# Patient Record
Sex: FEMALE | Employment: FULL TIME | URBAN - METROPOLITAN AREA
[De-identification: names, ages, dates, MRNs, and addresses within clinical notes are randomized per-mention and may not be internally consistent; named-entity substitution may affect disease eponyms.]

---

## 2019-02-03 ENCOUNTER — HOSPITAL ENCOUNTER (EMERGENCY)
Facility: CLINIC | Age: 34
Discharge: HOME | End: 2019-02-03
Attending: EMERGENCY MEDICINE | Admitting: EMERGENCY MEDICINE
Payer: MEDICAID

## 2019-02-03 ENCOUNTER — APPOINTMENT (OUTPATIENT)
Dept: ULTRASOUND IMAGING | Facility: CLINIC | Age: 34
End: 2019-02-03
Payer: MEDICAID

## 2019-02-03 ENCOUNTER — APPOINTMENT (OUTPATIENT)
Dept: GENERAL RADIOLOGY | Facility: CLINIC | Age: 34
End: 2019-02-03
Payer: MEDICAID

## 2019-02-03 VITALS
DIASTOLIC BLOOD PRESSURE: 73 MMHG | BODY MASS INDEX: 23.9 KG/M2 | OXYGEN SATURATION: 100 % | HEIGHT: 64 IN | TEMPERATURE: 98.2 F | HEART RATE: 69 BPM | SYSTOLIC BLOOD PRESSURE: 108 MMHG | WEIGHT: 140 LBS | RESPIRATION RATE: 18 BRPM

## 2019-02-03 DIAGNOSIS — R10.2 PELVIC PAIN AFFECTING PREGNANCY IN FIRST TRIMESTER, ANTEPARTUM: ICD-10-CM

## 2019-02-03 DIAGNOSIS — O26.891 PELVIC PAIN AFFECTING PREGNANCY IN FIRST TRIMESTER, ANTEPARTUM: ICD-10-CM

## 2019-02-03 LAB
ABO + RH BLD: NORMAL
ABO + RH BLD: NORMAL
ALBUMIN UR-MCNC: NEGATIVE MG/DL
ANION GAP SERPL CALCULATED.3IONS-SCNC: 7 MMOL/L (ref 3–14)
APPEARANCE UR: CLEAR
B-HCG SERPL-ACNC: ABNORMAL IU/L (ref 0–5)
BASOPHILS # BLD AUTO: 0.1 10E9/L (ref 0–0.2)
BASOPHILS NFR BLD AUTO: 0.5 %
BILIRUB UR QL STRIP: NEGATIVE
BLD GP AB SCN SERPL QL: NORMAL
BLOOD BANK CMNT PATIENT-IMP: NORMAL
BUN SERPL-MCNC: 8 MG/DL (ref 7–30)
CALCIUM SERPL-MCNC: 8.4 MG/DL (ref 8.5–10.1)
CHLORIDE SERPL-SCNC: 109 MMOL/L (ref 94–109)
CO2 SERPL-SCNC: 24 MMOL/L (ref 20–32)
COLOR UR AUTO: ABNORMAL
CREAT SERPL-MCNC: 0.58 MG/DL (ref 0.52–1.04)
DIFFERENTIAL METHOD BLD: ABNORMAL
EOSINOPHIL # BLD AUTO: 0.2 10E9/L (ref 0–0.7)
EOSINOPHIL NFR BLD AUTO: 1.8 %
ERYTHROCYTE [DISTWIDTH] IN BLOOD BY AUTOMATED COUNT: 12.6 % (ref 10–15)
GFR SERPL CREATININE-BSD FRML MDRD: >90 ML/MIN/{1.73_M2}
GLUCOSE SERPL-MCNC: 91 MG/DL (ref 70–99)
GLUCOSE UR STRIP-MCNC: NEGATIVE MG/DL
HCT VFR BLD AUTO: 39 % (ref 35–47)
HGB BLD-MCNC: 12.7 G/DL (ref 11.7–15.7)
HGB UR QL STRIP: NEGATIVE
IMM GRANULOCYTES # BLD: 0.1 10E9/L (ref 0–0.4)
IMM GRANULOCYTES NFR BLD: 0.5 %
KETONES UR STRIP-MCNC: NEGATIVE MG/DL
LEUKOCYTE ESTERASE UR QL STRIP: NEGATIVE
LYMPHOCYTES # BLD AUTO: 2.4 10E9/L (ref 0.8–5.3)
LYMPHOCYTES NFR BLD AUTO: 24.5 %
MCH RBC QN AUTO: 30 PG (ref 26.5–33)
MCHC RBC AUTO-ENTMCNC: 32.6 G/DL (ref 31.5–36.5)
MCV RBC AUTO: 92 FL (ref 78–100)
MONOCYTES # BLD AUTO: 0.9 10E9/L (ref 0–1.3)
MONOCYTES NFR BLD AUTO: 9.5 %
NEUTROPHILS # BLD AUTO: 6.2 10E9/L (ref 1.6–8.3)
NEUTROPHILS NFR BLD AUTO: 63.2 %
NITRATE UR QL: NEGATIVE
NRBC # BLD AUTO: 0 10*3/UL
NRBC BLD AUTO-RTO: 0 /100
PH UR STRIP: 8 PH (ref 5–7)
PLATELET # BLD AUTO: 133 10E9/L (ref 150–450)
POTASSIUM SERPL-SCNC: 3.6 MMOL/L (ref 3.4–5.3)
RBC # BLD AUTO: 4.23 10E12/L (ref 3.8–5.2)
RBC #/AREA URNS AUTO: <1 /HPF (ref 0–2)
SODIUM SERPL-SCNC: 140 MMOL/L (ref 133–144)
SOURCE: ABNORMAL
SP GR UR STRIP: 1.01 (ref 1–1.03)
SPECIMEN EXP DATE BLD: NORMAL
SPECIMEN SOURCE: NORMAL
SQUAMOUS #/AREA URNS AUTO: 1 /HPF (ref 0–1)
TROPONIN I SERPL-MCNC: <0.015 UG/L (ref 0–0.04)
UROBILINOGEN UR STRIP-MCNC: 0 MG/DL (ref 0–2)
WBC # BLD AUTO: 9.8 10E9/L (ref 4–11)
WBC #/AREA URNS AUTO: <1 /HPF (ref 0–5)
WET PREP SPEC: NORMAL

## 2019-02-03 PROCEDURE — 80048 BASIC METABOLIC PNL TOTAL CA: CPT | Performed by: EMERGENCY MEDICINE

## 2019-02-03 PROCEDURE — 85025 COMPLETE CBC W/AUTO DIFF WBC: CPT | Performed by: EMERGENCY MEDICINE

## 2019-02-03 PROCEDURE — 25000128 H RX IP 250 OP 636: Performed by: EMERGENCY MEDICINE

## 2019-02-03 PROCEDURE — 87591 N.GONORRHOEAE DNA AMP PROB: CPT | Performed by: EMERGENCY MEDICINE

## 2019-02-03 PROCEDURE — 84702 CHORIONIC GONADOTROPIN TEST: CPT | Performed by: EMERGENCY MEDICINE

## 2019-02-03 PROCEDURE — 99285 EMERGENCY DEPT VISIT HI MDM: CPT | Mod: 25

## 2019-02-03 PROCEDURE — 71046 X-RAY EXAM CHEST 2 VIEWS: CPT

## 2019-02-03 PROCEDURE — 76801 OB US < 14 WKS SINGLE FETUS: CPT

## 2019-02-03 PROCEDURE — 84484 ASSAY OF TROPONIN QUANT: CPT | Performed by: EMERGENCY MEDICINE

## 2019-02-03 PROCEDURE — 81001 URINALYSIS AUTO W/SCOPE: CPT | Performed by: EMERGENCY MEDICINE

## 2019-02-03 PROCEDURE — 87491 CHLMYD TRACH DNA AMP PROBE: CPT | Performed by: EMERGENCY MEDICINE

## 2019-02-03 PROCEDURE — 87210 SMEAR WET MOUNT SALINE/INK: CPT | Performed by: EMERGENCY MEDICINE

## 2019-02-03 PROCEDURE — 93005 ELECTROCARDIOGRAM TRACING: CPT

## 2019-02-03 PROCEDURE — 86900 BLOOD TYPING SEROLOGIC ABO: CPT | Performed by: EMERGENCY MEDICINE

## 2019-02-03 PROCEDURE — 86901 BLOOD TYPING SEROLOGIC RH(D): CPT | Performed by: EMERGENCY MEDICINE

## 2019-02-03 PROCEDURE — 86850 RBC ANTIBODY SCREEN: CPT | Performed by: EMERGENCY MEDICINE

## 2019-02-03 RX ORDER — SODIUM CHLORIDE 9 MG/ML
1000 INJECTION, SOLUTION INTRAVENOUS CONTINUOUS
Status: DISCONTINUED | OUTPATIENT
Start: 2019-02-03 | End: 2019-02-03 | Stop reason: HOSPADM

## 2019-02-03 RX ADMIN — SODIUM CHLORIDE 1000 ML: 9 INJECTION, SOLUTION INTRAVENOUS at 16:36

## 2019-02-03 ASSESSMENT — ENCOUNTER SYMPTOMS
CHILLS: 0
DYSURIA: 0
HEMATURIA: 0
NAUSEA: 0
FEVER: 0
DIARRHEA: 0
ABDOMINAL DISTENTION: 1
SHORTNESS OF BREATH: 1
FATIGUE: 1
VOMITING: 0

## 2019-02-03 ASSESSMENT — MIFFLIN-ST. JEOR: SCORE: 1315.04

## 2019-02-03 NOTE — ED PROVIDER NOTES
"  History     Chief Complaint:  Multiple Complaints      HPI   Ban Chou is a 33 year old female who is currently 7 weeks and 6 days pregnant who presents with lower abdominal pain. Her intermittent lower abdominal pain began 2.5 hours and is described as cramping. The pain began when she was arguing with her two younger sons when she had an abrupt onset of pain. She mentions that she has been feeling the baby moving but not as much as normal. Of note, the patient mentions that two weeks ago she had vaginal burning and bad odor. She believed she had a yeast infection so she bought over the counter medication which resolved the symptoms within three days. She is also complaining of chest pressure that began roughly around the same time her lower abdominal cramping began. The patient states the chest pressure is associated with shortness of breath. She also notes that she feel fatigue and as if her blood pressure was low. She mentions that she feels like she has a \"heavy rock sitting on my chest\". She denies fever, chills, nausea, vomiting, diarrhea, vaginal discharge, vaginal bleeding, dysuria, or hematuria. Of note, the patient mentions that her last period was on 2018. After the fourth pregnancy the patient had a miscarriage four years ago.      Allergies:  NKDA    Medications:    The patient is currently on no regular medications.    Past Medical History:    Varicosities    Past Surgical History:     section    Family History:    History reviewed. No pertinent family history.    Social History:  Marital Status:  Single [1]  Negative for tobacco use.  Negative for alcohol use.      Review of Systems   Constitutional: Positive for fatigue. Negative for chills and fever.   Respiratory: Positive for shortness of breath.    Cardiovascular: Positive for chest pain.   Gastrointestinal: Positive for abdominal distention. Negative for diarrhea, nausea and vomiting. " "  Genitourinary: Positive for pelvic pain. Negative for dysuria, hematuria, vaginal bleeding and vaginal discharge.   All other systems reviewed and are negative.      Physical Exam     Patient Vitals for the past 24 hrs:   BP Temp Temp src Pulse Resp SpO2 Height Weight   02/03/19 1522 108/73 98.2  F (36.8  C) Temporal 69 18 100 % 1.626 m (5' 4\") 63.5 kg (140 lb)           Physical Exam  Constitutional:  Appears well-developed and well-nourished. Alert. Conversant. Non toxic.  HENT:   Head: Atraumatic.   Nose: Nose normal.  Mouth/Throat: Oral mucosa is clear and moist. no trismus. Pharynx normal. Tonsils symmetric. No tonsillar enlargement, erythema, or exudate.  Eyes: Conjunctivae normal. EOM normal. Pupils equal, round, and reactive to light. No scleral icterus.   Neck: Normal range of motion. Neck supple. No tracheal deviation present.   Cardiovascular: Normal rate, regular rhythm. No gallop. No friction rub. No murmur heard. Symmetric radial artery pulses   Pulmonary/Chest: Effort normal. No stridor. No respiratory distress. No wheezes. No rales. No rhonchi . No tenderness.   Abdominal: Soft. Bowel sounds normal. No distension. No mass. Suprapubic mild tenderness. No rebound. No guarding. No CVA tenderness. No uterine enlargement  Pelvic: Performed with female chaperone. Normal external genitalia. Normal vaginal mucosa. Cervix closed. No bleeding.  There is some curd-like whitish vaginal discharge in the vaginal vault and attached to the cervix, but no cervical redness or strawberry cervix or cervicitis. No CMT.   Musculoskeletal:   RUE: Normal range of motion. No tenderness. No deformity  LUE: Normal range of motion. No tenderness. No deformity  RLE: Normal range of motion. No edema. No tenderness. No deformity  LLE: Normal range of motion. No edema. No tenderness. No deformity  Lymph: No cervical adenopathy.   Neurological: Alert and oriented to person, place, and time. Normal strength. CN II-VII intact. No " sensory deficit. GCS eye subscore is 4. GCS verbal subscore is 5. GCS motor subscore is 6. Normal coordination   Skin: Skin is warm and dry. No rash noted. No pallor. Normal capillary refill.  Psychiatric:  Normal mood. Normal affect.     Emergency Department Course   ECG:  Indication: Chest pressure  Time: 1544  Vent. Rate 70 bpm. CT interval 174. QRS duration 78. QT/QTc 358/386. P-R-T axis 46 40 27.  Normal sinus rhythm with sinus arrhythmia. Normal ECG. Read time: 1547    Imaging:  XR Chest 2 views:   No acute abnormality. Lungs are well-inflated and clear.  Heart size is normal. As per radiology.     US OB, <14 w Transvaginal:  1. Live intrauterine pregnancy with estimated gestational age 8 weeks  5 days by current measurement.  2. No evidence for subchorionic hemorrhage.  3. 3.3 cm left ovarian cyst. No free fluid as per radiology.     Laboratory:  CBC: WBC: 9.8, HGB: 12.7, PLT: 133(L)  BMP: Calcium 8.4(L), o/w WNL (Creatinine: 0.58)    UA with Microscopic: pH urine 8.0(H), o/w WNL    HCG Quantitative: 40,720(H)  ABO/Rh Type: O Positive    Wet prep: WNL    1622 Troponin: <0.015    Chlamydia trachomatis: pending  Neisseria gonorrhoea: pending    Interventions:  1636 Normal saline 1,000mL IV       Emergency Department Course:  Nursing notes and vitals reviewed. (1530) I performed an exam of the patient as documented above.     IV inserted. Medicine administered as documented above. Blood drawn. This was sent to the lab for further testing, results above.    EKG was done, interpretation as above.    The patient provided a urine sample here in the emergency department. This was sent for laboratory testing, findings above.     The patient was sent for a XR Chest, US OB while in the emergency department, findings above.     (1944) I rechecked the patient and discussed the results of her workup thus far.     Findings and plan explained to the Patient. Patient discharged home with instructions regarding supportive  care, medications, and reasons to return. The importance of close follow-up was reviewed.     I personally reviewed the laboratory results with the Patient and answered all related questions prior to discharge.     Impression & Plan    Medical Decision Making:  Ban Deras is a 33 year old female who I believe is a  or 4 who is now about 8 weeks pregnant based on LMP who presents to the ER today with a couple of complaints, most pressing being lower abdominal bilateral lower quadrant pelvic pain that began this afternoon.  No vaginal bleeding.  She has not yet had a chance to establish prenatal care or have any ultrasounds for this pregnancy.  Quantitative hCG is positive and consistent with dates.  Pelvic ultrasound confirms a live IUP at 8 weeks gestation.  No evidence for complex adnexal masses, ectopic pregnancy.  She has not had any vaginal bleeding and does not have any cervical dilation or bleeding on her pelvic exam but this pelvic cramping could be an early sign of threatened miscarriage.  Rh is positive.  Vital signs are stable.  Hemoglobin is normal at 12.7.  No active bleeding here in the ER.  She is having mild ongoing pain but is declining even Tylenol for analgesia.  She was very pleased to hear of a viable IUP consistent with dates on the ultrasound.  She is also had a recent whitish vaginal discharge, which had gotten better after treating it with an over-the-counter topical cream for yeast infection.  Wet prep today shows no ongoing infection.    Overall her abdominal exam is quite benign.  I do not think that her pain represents acute surgical emergency such as appendicitis think she needs advanced imaging to evaluate for that.  Other labs reassuring.  Urinalysis normal.     She was also endorsing a tight feeling in her chest.  EKG is nonischemic and shows no evidence for pericarditis.  Troponin negative.  Chest x-ray normal.  No evidence for pneumonia, pneumothorax,  pulmonary edema, pleural effusion, cardiomegaly.  No ripping tearing chest pain or any symptoms through to her back and normal mediastinum on x-ray so I doubt aortic dissection.  She is not really short of breath, tachypneic, hypoxic, tachycardic and has no pleuritic chest pain.  Although she is pregnant she has had no recent symptoms of leg swelling or ankle swelling to suggest DVT and no recent travel or risk factor for that.  Patient believes that some of her chest tightness was likely due to anxiety triggered by her concern for miscarriage with her pelvic pain.    Critical Care time:  none    Diagnosis:    ICD-10-CM    1. Pelvic pain affecting pregnancy in first trimester, antepartum O26.891     R10.2        Disposition:  discharged to home    Discharge Medications:     Medication List      There are no discharge medications for this visit.         Scribe Disclosure:  I, Deloris Beaulieu, am serving as a scribe on 2/3/2019 at 3:30 PM to personally document services performed by Horacio Gallegos, * based on my observations and the provider's statements to me.       Deloris Beaulieu  2/3/2019   [facility] EMERGENCY DEPARTMENT       Horacio Gallegos MD  02/08/19 [id number]

## 2019-02-03 NOTE — ED AVS SNAPSHOT
[facility] Emergency Department  [address]  [facility] [address]  Phone:  [phone]  Fax:  [phone]                                    Ban Deras   MRN: [id number]    Department:  [facility] Emergency Department   Date of Visit:  2/3/2019           After Visit Summary Signature Page    I have received my discharge instructions, and my questions have been answered. I have discussed any challenges I see with this plan with the nurse or doctor.    ..........................................................................................................................................  Patient/Patient Representative Signature      ..........................................................................................................................................  Patient Representative Print Name and Relationship to Patient    ..................................................               ................................................  Date                                   Time    ..........................................................................................................................................  Reviewed by Signature/Title    ...................................................              ..............................................  Date                                               Time          22EPIC Rev 08/18

## 2019-02-03 NOTE — ED TRIAGE NOTES
ABCs intact. Pt c/o Chest pressure and abdominal pain since this morning. Pt is pregnant. Denies discharge or bleeding. Pt denies v/d or urinary symptoms.

## 2019-02-04 LAB
C TRACH DNA SPEC QL NAA+PROBE: NEGATIVE
INTERPRETATION ECG - MUSE: NORMAL
N GONORRHOEA DNA SPEC QL NAA+PROBE: NEGATIVE
SPECIMEN SOURCE: NORMAL
SPECIMEN SOURCE: NORMAL

## 2019-02-04 NOTE — RESULT ENCOUNTER NOTE
Final result for both N. Gonorrhoeae PCR and Chlamydia Trachomatis PCR are NEGATIVE.  No treatment or change in treatment per [facility] ED Lab Result protocol.

## 2019-05-29 ENCOUNTER — HOSPITAL ENCOUNTER (OUTPATIENT)
Facility: CLINIC | Age: 34
Discharge: HOME | End: 2019-05-29
Attending: OBSTETRICS & GYNECOLOGY | Admitting: OBSTETRICS & GYNECOLOGY
Payer: COMMERCIAL

## 2019-05-29 VITALS — RESPIRATION RATE: 16 BRPM | DIASTOLIC BLOOD PRESSURE: 58 MMHG | TEMPERATURE: 98.8 F | SYSTOLIC BLOOD PRESSURE: 99 MMHG

## 2019-05-29 PROBLEM — Z36.89 ENCOUNTER FOR TRIAGE IN PREGNANT PATIENT: Status: ACTIVE | Noted: 2019-05-29

## 2019-05-29 PROCEDURE — G0463 HOSPITAL OUTPT CLINIC VISIT: HCPCS

## 2019-05-29 RX ORDER — PRENATAL VIT/IRON FUM/FOLIC AC 27MG-0.8MG
1 TABLET ORAL DAILY
COMMUNITY

## 2019-05-29 NOTE — PLAN OF CARE
Data: Patient assessed in the [facility] for abdominal pain and tailbone bone.  Cervical exam closed and thick.  Membranes intact.  Contractions none.  Action:  Presumed adequate fetal oxygenation documented (see flow record). Discharge instructions reviewed.  Patient instructed to report change in fetal movement, vaginal leaking of fluid or bleeding, abdominal pain, or any concerns related to the pregnancy to her nurse/physician.    Response: Orders to discharge home per Ceasar Calderon.  Patient verbalized understanding of education and verbalized agreement with plan. Discharged to home at 1755.

## 2019-05-29 NOTE — PLAN OF CARE
Data: Patient presented to [facility]: 2019  4:49 PM.  Reason for maternal/fetal assessment is 2 contractions today, pain when the baby moves, and tailbone pain that started yesterday. Patient is a .  Prenatal record reviewed. Pregnancy  has been complicated by thrombocytopenia.  Gestational Age 24w2d. VSS. Fetal movement present. Patient denies leaking of vaginal fluid/rupture of membranes, vaginal bleeding. Support person is present.   Action: Verbal consent for EFM. Triage assessment completed. Bill of rights reviewed.  Response: Patient verbalized agreement with plan. Will contact Dr Ceasar Calderon with update and further orders.

## 2019-05-29 NOTE — PROVIDER NOTIFICATION
05/29/19 1726   Provider Notification   Provider Name/Title Dr Calderon   Method of Notification Phone   Request Evaluate - Remote   Notification Reason Status Update   Updated re: pt arrival. Dr Calderon states he reviewed patients history and chart. Updated on pt complaints today.  will come to see patient and evaluate.

## 2019-05-29 NOTE — PLAN OF CARE
phone used for admission  number [id number]. Pt does speak some English, mostly requesting  for medical terms.

## 2019-05-29 NOTE — PLAN OF CARE
Dr Calderon evaluated patient at bedside. Reviewed tracing and performed bedside US. Orders for FFN and SVE. Pt has had sex in last 24 hours so FFN was not done. Cervix thick and closed. Dr Calderon updated and orders received for discharge.

## 2019-05-29 NOTE — DISCHARGE INSTRUCTIONS
Discharge Instruction for Undelivered Patients      You were seen for: abdominal pain and tailbone pain  We Consulted: Dr Calderon  You had (Test or Medicine):fetal and uterine monitoring, bedside ultrasound, cervical exam     Diet:   Drink 8 to 12 glasses of liquids (milk, juice, water) every day.  You may eat meals and snacks.     Activity:  Normal activity    Call your provider if you notice:  Swelling in your face or increased swelling in your hands or legs.  Headaches that are not relieved by Tylenol (acetaminophen).  Changes in your vision (blurring: seeing spots or stars.)  Nausea (sick to your stomach) and vomiting (throwing up).   Weight gain of 5 pounds or more per week.  Heartburn that doesn't go away.  Signs of bladder infection: pain when you urinate (use the toilet), need to go more often and more urgently.  The bag of broussard (rupture of membranes) breaks, or you notice leaking in your underwear.  Bright red blood in your underwear.  Abdominal (lower belly) or stomach pain.  Second (plus) baby: Contractions (tightening) less than 10 minutes apart and getting stronger.  *If less than 34 weeks: Contractions (tightenings) more than 6 times in one hour.  Increase or change in vaginal discharge (note the color and amount)  Other: Call clinic with any other questions or concerns.    Follow-up:  Make an appointment with your clinic to be seen in the next week for a follow up.